# Patient Record
(demographics unavailable — no encounter records)

---

## 2025-05-27 NOTE — HISTORY OF PRESENT ILLNESS
Has Your Skin Lesion Been Treated?: not been treated [FreeTextEntry1] : Blood pressure and reactive airway disease follow-up Is This A New Presentation, Or A Follow-Up?: Skin Lesion [de-identified] : Otherwise well, no complaints

## 2025-05-27 NOTE — PHYSICAL EXAM
Patient was discharged on 12/22.   Please complete TCM call 12/23;12/24.      TRANSITIONAL CARE MANAGEMENT - HOSPITAL DISCHARGE FOLLOW-UP    Contacted Mr. Morgan regarding follow-up for CVA after hospital discharge. He was discharged from the hospital on 12/22. Review of the After Visit Summary from the recent hospitalization indicates that the patient needs to follow up with PCP and neuro.    He feels that he is doing fairly well at home.   His diet concern is none. Overall, the patient is eating well.   Ambulation: staying the same  Fever: is not present  Pain: none  Activities of Daily Living (global): Self-care   Patient states that he does have sufficient family support. He feels that he is able to ask for assistance when needed.     Additional patient/family concerns: tingling in left hand.    Discharge medications were verified with the patient. He is fully compliant with the medication regimen prescribed at the time of discharge. He reports that he is not experiencing any medication side effects.    STOP taking these medications:   Empagliflozin 10 MG Tabs  glipiZIDE 10 MG tablet  metFORMIN 1000 MG tablet  verapamil HCl 180 MG extended release capsule    Start taking these Medications:  Check blood sugar readings twice daily, before breakfast and before supper  hydrALAZINE 25 MG tablet  Commonly known as: APRESOLINE  Take 1 tablet (25 mg total) by mouth every 8 hours.  Insulin Aspart Prot & Aspart (70-30) 100 UNIT/ML pen-injector  Commonly known as: NOVOLOG MIX 70/30 FLEXPEN  Max Daily Dose= 60 units. Use based insulin dosing chart: Breakfast= 18+2; Supper= 10+2.  Following the sliding scale provided at discharge.  CONTINUE these medications which have CHANGED   hydroCHLOROthiazide 50 MG tablet  Take 1 tablet (50 mg total) by mouth daily.  losartan 50 MG tablet  Take 1 tablet (50 mg total) by mouth 2 times daily.    Med rec completed and updated in [facility].    Upon discharge, the patient was to receive no  additional services. Patient now on Insulin and wife is giving the injections.   He is checking his blood sugar 4 times per day. He is monitoring BP. Has own cuff.   Advance Directives:  discussed and patient needs to bring in a copy    Patient has an appointment on 1/2/20 with Thuy Lagos MD. Mr. Morgan was reminded about the importance of keeping this appointment.   To bring BS log and BP log as well as current med list to his appointment.  Wife/pt to call if any questions or concerns.     [Normal Sclera/Conjunctiva] : normal sclera/conjunctiva [No Lymphadenopathy] : no lymphadenopathy [Normal] : normal rate, regular rhythm, normal S1 and S2 and no murmur heard [Soft] : abdomen soft [No Focal Deficits] : no focal deficits [de-identified] : Left basilar faint rhonchi remainder of airfield have mildly reduced air entry a little bit better on the right apex